# Patient Record
Sex: MALE | Race: WHITE | NOT HISPANIC OR LATINO | Employment: FULL TIME | URBAN - NONMETROPOLITAN AREA
[De-identification: names, ages, dates, MRNs, and addresses within clinical notes are randomized per-mention and may not be internally consistent; named-entity substitution may affect disease eponyms.]

---

## 2017-07-23 ENCOUNTER — HISTORY (OUTPATIENT)
Dept: EMERGENCY MEDICINE | Facility: OTHER | Age: 32
End: 2017-07-23

## 2017-10-17 ENCOUNTER — AMBULATORY - GICH (OUTPATIENT)
Dept: SCHEDULING | Facility: OTHER | Age: 32
End: 2017-10-17

## 2019-05-02 ENCOUNTER — OFFICE VISIT (OUTPATIENT)
Dept: FAMILY MEDICINE | Facility: OTHER | Age: 34
End: 2019-05-02
Attending: NURSE PRACTITIONER
Payer: COMMERCIAL

## 2019-05-02 VITALS
TEMPERATURE: 97.2 F | BODY MASS INDEX: 29.05 KG/M2 | RESPIRATION RATE: 18 BRPM | DIASTOLIC BLOOD PRESSURE: 70 MMHG | WEIGHT: 196.13 LBS | SYSTOLIC BLOOD PRESSURE: 124 MMHG | HEART RATE: 68 BPM | HEIGHT: 69 IN

## 2019-05-02 DIAGNOSIS — L40.9 PSORIASIS: Primary | ICD-10-CM

## 2019-05-02 DIAGNOSIS — B35.4 TINEA CORPORIS: ICD-10-CM

## 2019-05-02 PROCEDURE — G0463 HOSPITAL OUTPT CLINIC VISIT: HCPCS

## 2019-05-02 PROCEDURE — 99213 OFFICE O/P EST LOW 20 MIN: CPT | Performed by: NURSE PRACTITIONER

## 2019-05-02 RX ORDER — TRIAMCINOLONE ACETONIDE 1 MG/G
OINTMENT TOPICAL 2 TIMES DAILY
Qty: 80 G | Refills: 0 | Status: SHIPPED | OUTPATIENT
Start: 2019-05-02

## 2019-05-02 RX ORDER — KETOCONAZOLE 20 MG/G
CREAM TOPICAL DAILY
Qty: 30 G | Refills: 0 | Status: SHIPPED | OUTPATIENT
Start: 2019-05-02

## 2019-05-02 ASSESSMENT — MIFFLIN-ST. JEOR: SCORE: 1815

## 2019-05-02 ASSESSMENT — ENCOUNTER SYMPTOMS
FEVER: 0
ACTIVITY CHANGE: 0
CHILLS: 0
FATIGUE: 0

## 2019-05-02 ASSESSMENT — PAIN SCALES - GENERAL: PAINLEVEL: NO PAIN (0)

## 2019-05-02 NOTE — PROGRESS NOTES
"  SUBJECTIVE:   Davon Nash is a 33 year old male who presents to clinic today for the following health issues:    HPI  Patient presents for evaluation of psoriasis. Has patches on bilateral elbows and bilateral knees. Report having this for ten years, but feels like he wants to get it treated now. He reports no joint involvement. Nails normal in appearance. He has not treated this with any medication and just dealt with the lesions.   He also notes a quarter size lesion on his left chest. It is pruritic.     There are no active problems to display for this patient.    Past Medical History:   Diagnosis Date     Psoriasis 2010      History reviewed. No pertinent surgical history.    Review of Systems   Constitutional: Negative for activity change, chills, fatigue and fever.   Skin: Positive for rash.        OBJECTIVE:     /70 (BP Location: Right arm, Patient Position: Sitting, Cuff Size: Adult Large)   Pulse 68   Temp 97.2  F (36.2  C) (Tympanic)   Resp 18   Ht 1.753 m (5' 9\")   Wt 89 kg (196 lb 2 oz)   BMI 28.96 kg/m    Body mass index is 28.96 kg/m .  Physical Exam   Constitutional: He appears well-developed and well-nourished. No distress.   Skin: Skin is warm and dry. Rash noted.   Rash: Lesion on bilateral elbows and bilateral knees. Red scaling defined papule with varying amounts of silvery scales.   He has a quarter size lesion on left chest with scaling edges and central clearing.   Diagnostic Test Results:  none     ASSESSMENT/PLAN:   1. Psoriasis  Hand-out provided on psoriasis. We will start with topical steroid plus vitamin D analogue for initial treatment. I recommended daily emollient, especially after bathing/showering. I suggested referral to dermatology to discuss further management of lesions, he may benefit from UV therapy or other medicatons to better manage his psoriasis.   - DERMATOLOGY REFERRAL    2. Tinea corporis:  Small lesion noted on his left chest possible tinea infection. " Discussed use of antifungal cream, prescribed ointment. Use twice daily until lesions resolves. Discussed that if no improvement could be a psoriatic lesion and would also benefit from steroid cream.     I spent approximately 15 minutes with the patient (exclusive of separately billed services/procedures), with greater than 50% spent in counseling, prognosis, risks and benefits of management or follow-up.  Reviewed importance of compliance with chosen treatment options and follow-up.  Risk factor reduction and patient education and coordinating care, establishing and/or reviewing the patient's medical record also completed during today's exam .    Yolanda Rodriguez [facility] AND [facility]

## 2019-05-02 NOTE — NURSING NOTE
Patient presents to clinic for psoriasis outbreak on right elbow and upper left chest.    Medication Reconciliation: complete    Celia Olivier LPN

## 2019-05-03 RX ORDER — CALCIPOTRIENE 50 UG/G
OINTMENT TOPICAL
Qty: 120 G | Refills: 3 | Status: SHIPPED | OUTPATIENT
Start: 2019-05-03

## 2019-05-14 ENCOUNTER — TRANSFERRED RECORDS (OUTPATIENT)
Dept: HEALTH INFORMATION MANAGEMENT | Facility: OTHER | Age: 34
End: 2019-05-14

## 2020-05-14 DIAGNOSIS — L40.0 PSORIASIS VULGARIS: Primary | ICD-10-CM

## 2020-05-14 DIAGNOSIS — Z79.899 ENCOUNTER FOR LONG-TERM (CURRENT) USE OF OTHER MEDICATIONS: ICD-10-CM

## 2020-05-18 DIAGNOSIS — Z79.899 ENCOUNTER FOR LONG-TERM (CURRENT) USE OF OTHER MEDICATIONS: ICD-10-CM

## 2020-05-18 DIAGNOSIS — L40.0 PSORIASIS VULGARIS: ICD-10-CM

## 2020-05-18 PROCEDURE — 86705 HEP B CORE ANTIBODY IGM: CPT | Mod: ZL | Performed by: DERMATOLOGY

## 2020-05-18 PROCEDURE — 87389 HIV-1 AG W/HIV-1&-2 AB AG IA: CPT | Mod: ZL | Performed by: DERMATOLOGY

## 2020-05-18 PROCEDURE — 36415 COLL VENOUS BLD VENIPUNCTURE: CPT | Mod: ZL | Performed by: DERMATOLOGY

## 2020-05-18 PROCEDURE — 86481 TB AG RESPONSE T-CELL SUSP: CPT | Mod: ZL | Performed by: DERMATOLOGY

## 2020-05-18 PROCEDURE — 86803 HEPATITIS C AB TEST: CPT | Mod: ZL | Performed by: DERMATOLOGY

## 2020-05-18 PROCEDURE — 87340 HEPATITIS B SURFACE AG IA: CPT | Mod: ZL | Performed by: DERMATOLOGY

## 2020-05-20 LAB
GAMMA INTERFERON BACKGROUND BLD IA-ACNC: 0.02 IU/ML
HBV CORE IGM SERPL QL IA: NONREACTIVE
HBV SURFACE AG SERPL QL IA: NONREACTIVE
HCV AB SERPL QL IA: NONREACTIVE
HIV 1+2 AB+HIV1 P24 AG SERPL QL IA: NONREACTIVE
M TB IFN-G BLD-IMP: NEGATIVE
M TB IFN-G CD4+ BCKGRND COR BLD-ACNC: 8.49 IU/ML
MITOGEN IGNF BCKGRD COR BLD-ACNC: 0.03 IU/ML
MITOGEN IGNF BCKGRD COR BLD-ACNC: 0.04 IU/ML

## 2020-07-27 ENCOUNTER — OFFICE VISIT (OUTPATIENT)
Dept: FAMILY MEDICINE | Facility: OTHER | Age: 35
End: 2020-07-27
Attending: FAMILY MEDICINE
Payer: COMMERCIAL

## 2020-07-27 ENCOUNTER — HOSPITAL ENCOUNTER (OUTPATIENT)
Dept: GENERAL RADIOLOGY | Facility: OTHER | Age: 35
End: 2020-07-27
Attending: FAMILY MEDICINE
Payer: COMMERCIAL

## 2020-07-27 VITALS
BODY MASS INDEX: 26.32 KG/M2 | OXYGEN SATURATION: 98 % | WEIGHT: 178.2 LBS | HEART RATE: 63 BPM | RESPIRATION RATE: 12 BRPM | SYSTOLIC BLOOD PRESSURE: 134 MMHG | DIASTOLIC BLOOD PRESSURE: 68 MMHG | TEMPERATURE: 98.4 F

## 2020-07-27 DIAGNOSIS — M25.552 HIP PAIN, LEFT: ICD-10-CM

## 2020-07-27 DIAGNOSIS — M25.511 CHRONIC RIGHT SHOULDER PAIN: ICD-10-CM

## 2020-07-27 DIAGNOSIS — M25.552 HIP PAIN, LEFT: Primary | ICD-10-CM

## 2020-07-27 DIAGNOSIS — L40.9 PSORIASIS: ICD-10-CM

## 2020-07-27 DIAGNOSIS — G89.29 CHRONIC RIGHT SHOULDER PAIN: ICD-10-CM

## 2020-07-27 DIAGNOSIS — D17.30 LIPOMA OF SKIN AND SUBCUTANEOUS TISSUE: ICD-10-CM

## 2020-07-27 PROCEDURE — 99214 OFFICE O/P EST MOD 30 MIN: CPT | Performed by: FAMILY MEDICINE

## 2020-07-27 PROCEDURE — G0463 HOSPITAL OUTPT CLINIC VISIT: HCPCS

## 2020-07-27 PROCEDURE — 73502 X-RAY EXAM HIP UNI 2-3 VIEWS: CPT

## 2020-07-27 RX ORDER — ADALIMUMAB 40MG/0.4ML
KIT SUBCUTANEOUS
COMMUNITY
Start: 2020-07-22

## 2020-07-27 ASSESSMENT — PAIN SCALES - GENERAL: PAINLEVEL: MILD PAIN (2)

## 2020-07-27 ASSESSMENT — PATIENT HEALTH QUESTIONNAIRE - PHQ9: SUM OF ALL RESPONSES TO PHQ QUESTIONS 1-9: 0

## 2020-07-27 NOTE — NURSING NOTE
Patient here for right shoulder pain, left hip pain and a lump on his back.  After receiving his second Humaria injection he started with acid reflux then started omeprazole.Medication Reconciliation: complete.    Matilde Brown LPN  7/27/2020 3:45 PM

## 2020-07-28 PROBLEM — G89.29 CHRONIC RIGHT SHOULDER PAIN: Status: ACTIVE | Noted: 2020-07-28

## 2020-07-28 PROBLEM — M25.511 CHRONIC RIGHT SHOULDER PAIN: Status: ACTIVE | Noted: 2020-07-28

## 2020-07-28 PROBLEM — L40.9 PSORIASIS: Status: ACTIVE | Noted: 2020-07-28

## 2020-07-28 PROBLEM — D17.30 LIPOMA OF SKIN AND SUBCUTANEOUS TISSUE: Status: ACTIVE | Noted: 2020-07-28

## 2020-07-28 NOTE — PROGRESS NOTES
"  SUBJECTIVE:   Davon Nash is a 35 year old male who presents to clinic today for the following health issues: Right shoulder pain left hip pain lump on his back    Patient arrives here because of right shoulder pain left hip pain lump on his back.  He lump on his back seems to be increasing in size.  He had recently started Humira and has had 2 doses.  Patient states that he has a history of psoriasis.  He brought up his shoulder and hip pain to his dermatologist he states his dermatologist felt that it was likely overuse.  His shoulder and hip have gotten a little bit better over the last week.  The pain now is on and off through the years.  He denies any injury to the right shoulder or hip.  He is not complaining of any groin pain to the hip but states is more lateral and feels \"deep\".        There are no active problems to display for this patient.    Past Medical History:   Diagnosis Date     Psoriasis 2010      History reviewed. No pertinent surgical history.    Review of Systems     OBJECTIVE:     /68   Pulse 63   Temp 98.4  F (36.9  C)   Resp 12   Wt 80.8 kg (178 lb 3.2 oz)   SpO2 98%   BMI 26.32 kg/m    Body mass index is 26.32 kg/m .  Physical Exam  Constitutional:       Appearance: Normal appearance.   Cardiovascular:      Rate and Rhythm: Regular rhythm.   Pulmonary:      Effort: Pulmonary effort is normal.      Breath sounds: Normal breath sounds.   Musculoskeletal: Normal range of motion.         General: No swelling, tenderness, deformity or signs of injury.      Comments: Both right shoulder and left hip have good range of motion with no pain.   Skin:     Comments: Typical lipoma over his left shoulder.   Neurological:      Mental Status: He is alert.         Diagnostic Test Results:  none     ASSESSMENT/PLAN:         1. Hip pain, left  X-rays show osteophyte present.  Radiology report indicates possible pincer deformity.  Letter will be dictated.  - XR Hip Left 2-3 Views; " Future    2. Chronic right shoulder pain  Possible rotator cuff in origin.  He certainly has good range of motion.  I also discussed this could be possible due to psoriatic arthritis.    3. Lipoma of skin and subcutaneous tissue  Reassurance is given    4. Psoriasis          Vladimir Oglesby MD  [facility] AND [facility]

## 2020-08-25 ENCOUNTER — OFFICE VISIT (OUTPATIENT)
Dept: ORTHOPEDICS | Facility: OTHER | Age: 35
End: 2020-08-25
Attending: FAMILY MEDICINE
Payer: COMMERCIAL

## 2020-08-25 VITALS
HEART RATE: 56 BPM | BODY MASS INDEX: 26.73 KG/M2 | SYSTOLIC BLOOD PRESSURE: 130 MMHG | DIASTOLIC BLOOD PRESSURE: 80 MMHG | WEIGHT: 181 LBS

## 2020-08-25 DIAGNOSIS — M25.511 RIGHT SHOULDER PAIN, UNSPECIFIED CHRONICITY: Primary | ICD-10-CM

## 2020-08-25 DIAGNOSIS — M25.552 HIP PAIN, LEFT: ICD-10-CM

## 2020-08-25 PROCEDURE — 99203 OFFICE O/P NEW LOW 30 MIN: CPT | Performed by: ORTHOPAEDIC SURGERY

## 2020-08-25 PROCEDURE — G0463 HOSPITAL OUTPT CLINIC VISIT: HCPCS

## 2020-08-25 NOTE — PROGRESS NOTES
Visit Date:   08/25/2020      REASON FOR EVALUATION:     1.  Left hip pain.   2.  Right shoulder pain.      HISTORY:  Davon comes in with regards to left hip as well as shoulder pain.  It has been going on for years.  It seems to be getting worse here.  Symptoms are kind of random.  He gets a catching and locking in his hip at times.  Pain is sharp within the groin as well.  Had x-rays done previously that did show evidence for some hip impingement with overgrowth on the femoral head as well as off the acetabulum.  Symptoms seem to be somewhat progressive here over time.  He is not undergone any specific treatment as far as therapy or injections.  Does use just sparingly occasional anti-inflammatories, but nothing of any consistency at this point.      He is also reporting right shoulder pain, especially with overactivity, reaching out and reaching across.  That has been ongoing again for a couple of years in nature.  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  He does have nighttime discomfort.  It hurts when he lies on that side to some extent here as well, in addition to that.  The patient reports a progression of his overall symptomatology there at this time.      MEDICATIONS:  Have been reviewed.      ALLERGIES:  NOTED TO HYDROCORTISONE.      REVIEW OF SYSTEMS:  Otherwise negative with the exception as stated above.      PHYSICAL EXAMINATION:  The patient is 178 pounds.  Blood pressure is 130/80, pulse 56.  Alert and oriented x3, cooperative and pleasant with exam, in no acute distress.  Does ambulate with satisfactory gait.  Affect is appropriate here as well.  Examination of the left hip shows hip flex to 100, internal rotation of 10, external rotation of 20 and abduction of 30.  Pain with hip flexion and internal rotation is noted at this point in time.  No pain with palpation across the trochanteric bursa.  Mild swelling is  seen about the hip itself.  Dorsalis pedis pulse 2+.  Straight leg raise also asymptomatic and no pain with FREDRICK test.  Right shoulder exam shows full range of motion, pain with Cassia testing.  Pain with impingement test.  Mild weakness with abduction as well as external strength testing against resistance at this point in time.  Spurling test is negative.  No instability is identified.  Radial pulse 2+ and again no skin lesions otherwise seen.      X-rays were reviewed.  Does show evidence for left hip impingement with minimal arthritic changes seen at this point in time.      IMPRESSION:     1.  Left hip pain, possible labral pathology with underlying impingement.   2.  Right shoulder pain, possible cuff pathology plus anterior labral pathology.      PLAN:  MRI arthrogram left hip has been advised and recommended given constellation of symptoms as well as what reproduces this as well as underlying bone anatomy as far as the shoulder is concerned.  I would recommend he does proceed forward with MRI of the shoulder to evaluate for any rotator cuff tearing as well as a superior labral tear and he will be contacted once that study is complete on both sites here.         PRITESH BERNAL MD             D: 2020   T: 2020   MT: JENARO      Name:     TAZ JOHN   MRN:      [id number]        Account:      [id number]   :      1985           Visit Date:   2020      Document: [id number]

## 2020-08-25 NOTE — PROGRESS NOTES
Patient is here for consult on his left hip pain.  Janey Schumacher LPN .....................8/25/2020 10:41 AM

## 2020-09-03 ENCOUNTER — HOSPITAL ENCOUNTER (OUTPATIENT)
Dept: MRI IMAGING | Facility: OTHER | Age: 35
End: 2020-09-03
Attending: ORTHOPAEDIC SURGERY
Payer: COMMERCIAL

## 2020-09-03 ENCOUNTER — HOSPITAL ENCOUNTER (OUTPATIENT)
Dept: GENERAL RADIOLOGY | Facility: OTHER | Age: 35
End: 2020-09-03
Attending: ORTHOPAEDIC SURGERY
Payer: COMMERCIAL

## 2020-09-03 DIAGNOSIS — M25.552 HIP PAIN, LEFT: ICD-10-CM

## 2020-09-03 DIAGNOSIS — M25.511 RIGHT SHOULDER PAIN, UNSPECIFIED CHRONICITY: ICD-10-CM

## 2020-09-03 PROCEDURE — 25000125 ZZHC RX 250: Performed by: RADIOLOGY

## 2020-09-03 PROCEDURE — A9575 INJ GADOTERATE MEGLUMI 0.1ML: HCPCS | Performed by: RADIOLOGY

## 2020-09-03 PROCEDURE — 77002 NEEDLE LOCALIZATION BY XRAY: CPT

## 2020-09-03 PROCEDURE — 25500064 ZZH RX 255 OP 636: Performed by: RADIOLOGY

## 2020-09-03 PROCEDURE — 73722 MRI JOINT OF LWR EXTR W/DYE: CPT | Mod: LT

## 2020-09-03 PROCEDURE — 73221 MRI JOINT UPR EXTREM W/O DYE: CPT | Mod: RT

## 2020-09-03 RX ORDER — GADOTERATE MEGLUMINE 376.9 MG/ML
0.1 INJECTION INTRAVENOUS ONCE
Status: COMPLETED | OUTPATIENT
Start: 2020-09-03 | End: 2020-09-03

## 2020-09-03 RX ORDER — LIDOCAINE HYDROCHLORIDE 10 MG/ML
2 INJECTION, SOLUTION EPIDURAL; INFILTRATION; INTRACAUDAL; PERINEURAL ONCE
Status: COMPLETED | OUTPATIENT
Start: 2020-09-03 | End: 2020-09-03

## 2020-09-03 RX ADMIN — IOHEXOL 5 ML: 240 INJECTION, SOLUTION INTRATHECAL; INTRAVASCULAR; INTRAVENOUS; ORAL at 15:12

## 2020-09-03 RX ADMIN — LIDOCAINE HYDROCHLORIDE 2 ML: 10 INJECTION, SOLUTION INFILTRATION; PERINEURAL at 15:13

## 2020-09-03 RX ADMIN — GADOTERATE MEGLUMINE 0.1 ML: 376.9 INJECTION INTRAVENOUS at 15:12

## 2020-09-15 ENCOUNTER — TRANSFERRED RECORDS (OUTPATIENT)
Dept: HEALTH INFORMATION MANAGEMENT | Facility: OTHER | Age: 35
End: 2020-09-15

## 2020-09-22 ENCOUNTER — OFFICE VISIT (OUTPATIENT)
Dept: ORTHOPEDICS | Facility: OTHER | Age: 35
End: 2020-09-22
Attending: ORTHOPAEDIC SURGERY
Payer: COMMERCIAL

## 2020-09-22 DIAGNOSIS — G89.29 CHRONIC RIGHT SHOULDER PAIN: Primary | ICD-10-CM

## 2020-09-22 DIAGNOSIS — M25.511 CHRONIC RIGHT SHOULDER PAIN: Primary | ICD-10-CM

## 2020-09-22 PROCEDURE — G0463 HOSPITAL OUTPT CLINIC VISIT: HCPCS

## 2020-09-22 PROCEDURE — 25000125 ZZHC RX 250: Performed by: ORTHOPAEDIC SURGERY

## 2020-09-22 PROCEDURE — 99213 OFFICE O/P EST LOW 20 MIN: CPT | Mod: 25 | Performed by: ORTHOPAEDIC SURGERY

## 2020-09-22 PROCEDURE — G0463 HOSPITAL OUTPT CLINIC VISIT: HCPCS | Mod: 25

## 2020-09-22 PROCEDURE — 20610 DRAIN/INJ JOINT/BURSA W/O US: CPT | Mod: RT | Performed by: ORTHOPAEDIC SURGERY

## 2020-09-22 PROCEDURE — 25000128 H RX IP 250 OP 636: Performed by: ORTHOPAEDIC SURGERY

## 2020-09-22 RX ORDER — LIDOCAINE HYDROCHLORIDE 10 MG/ML
4 INJECTION, SOLUTION EPIDURAL; INFILTRATION; INTRACAUDAL; PERINEURAL ONCE
Status: COMPLETED | OUTPATIENT
Start: 2020-09-22 | End: 2020-09-22

## 2020-09-22 RX ORDER — TRIAMCINOLONE ACETONIDE 40 MG/ML
40 INJECTION, SUSPENSION INTRA-ARTICULAR; INTRAMUSCULAR ONCE
Status: COMPLETED | OUTPATIENT
Start: 2020-09-22 | End: 2020-09-22

## 2020-09-22 RX ADMIN — LIDOCAINE HYDROCHLORIDE 4 ML: 10 INJECTION, SOLUTION EPIDURAL; INFILTRATION; INTRACAUDAL; PERINEURAL at 14:39

## 2020-09-22 RX ADMIN — TRIAMCINOLONE ACETONIDE 40 MG: 40 INJECTION, SUSPENSION INTRA-ARTICULAR; INTRAMUSCULAR at 14:39

## 2020-09-22 NOTE — PROGRESS NOTES
Patient is here for follow up on his shoulder and hip.  Janey Schumacher LPN .....................9/22/2020 2:24 PM

## 2020-09-23 NOTE — PROGRESS NOTES
Visit Date:   2020      REASON FOR EVALUATION:  Right shoulder pain.      HISTORY:  Davon comes in followup for right shoulder MRI results.  MRI does show cuff tendinitis, some labral tearing, AC joint arthrosis, type 2 acromion and the rotator cuff tendinitis and partial sided fraying present at this point in time.  The patient is here to look at an injection set up for therapies and then moving forward with the next steps.  He does have a labral cyst present as well in addition to anterior superior labral tear.  Does report pain with activity, a little bit better with rest.  Symptoms seem to be somewhat progressive here in time.      PHYSICAL EXAMINATION:  Shoulder shows full range of motion, pain with elevation to 90, pain with impingement test, pain with cross arm adduction.  Pain with speed testing as well as [place] testing here in addition to that.  Rotator cuff strength is 5/5.  Neurovascular examination otherwise intact.      PROCEDURE:  Following informed consent and sterile preparation, the patient's right shoulder subacromial space was injected with 4 mL of 1% lidocaine and 40 mg of Kenalog under sterile conditions.  The patient did tolerate the procedure well.      IMPRESSION:  Right shoulder cuff tendinitis, AC arthrosis with underlying labral tear.      PLAN:  Injection at this time, set him up in for formalized therapy.  Reassess 4 weeks to determine if he is improving or not.  If no success or progress was identified would recommend consideration for shoulder arthroscopy, decompression, distal clavicle and labral debridement.  The patient is in agreement with that plan and will see him back in 4 weeks as scheduled.         PRITESH BERNAL MD             D: 2020   T: 2020   MT:       Name:     DAVON JOHN   MRN:      [id number]        Account:      [id number]   :      1985           Visit Date:   2020      Document: [id number]

## 2020-09-24 ENCOUNTER — HOSPITAL ENCOUNTER (OUTPATIENT)
Dept: PHYSICAL THERAPY | Facility: OTHER | Age: 35
Setting detail: THERAPIES SERIES
End: 2020-09-24
Attending: ORTHOPAEDIC SURGERY
Payer: COMMERCIAL

## 2020-09-24 DIAGNOSIS — M25.511 CHRONIC RIGHT SHOULDER PAIN: ICD-10-CM

## 2020-09-24 DIAGNOSIS — G89.29 CHRONIC RIGHT SHOULDER PAIN: ICD-10-CM

## 2020-09-24 PROCEDURE — 97110 THERAPEUTIC EXERCISES: CPT | Mod: GP

## 2020-09-24 PROCEDURE — 97161 PT EVAL LOW COMPLEX 20 MIN: CPT | Mod: GP

## 2020-09-26 NOTE — PROGRESS NOTES
[facility]          OUTPATIENT PHYSICAL THERAPY ORTHOPEDIC EVALUATION  PLAN OF TREATMENT FOR OUTPATIENT REHABILITATION  (COMPLETE FOR INITIAL CLAIMS ONLY)  Patient's Last Name, First Name, M.I.  YOB: 1985  Van Nashon  ANNE-MARIE    Provider s Name:  [facility]   Medical Record No.  [id number]   Start of Care Date:  09/24/20   Onset Date:  (2 years ago)   Type:     _X__PT   ___OT   ___SLP Medical Diagnosis:  Chronic R shoulder pain     PT Diagnosis:  impaired shoulder funciton   Visits from SOC:  1      _________________________________________________________________________________  Plan of Treatment/Functional Goals:  joint mobilization, manual therapy, neuromuscular re-education, ROM, strengthening, stretching     Hot packs, Iontophoresis (Dexamethasone 4mg per mL), Ultrasound, Electrical stimulation, Cryotherapy     Goals  Goal Identifier: Posture  Goal Description: Pt will demonstrate improved posture with more retracted shoulders to allow for more GH clearance during overhead movements.  Target Date: 10/24/20    Goal Identifier: Work tolerance  Goal Description: Pt will be able to complete full work shift and all duties without increasing pain in 4 weeks  Target Date: 10/24/20    Goal Identifier: Strength  Goal Description: Pt will be able to lift 75 lbs from floor to shoulder height shelf in order to perform job duties.  Target Date: 10/24/20      Therapy Frequency:  2 times/Week  Predicted Duration of Therapy Intervention:  4 weeks    Jim Caldwell PT                 I CERTIFY THE NEED FOR THESE SERVICES FURNISHED UNDER        THIS PLAN OF TREATMENT AND WHILE UNDER MY CARE .             Physician Signature               Date    X_____________________________________________________                             Certification Date From:  09/24/20   Certification Date To:  11/07/20    Referring Provider:  Dr. Pendleton    Initial Assessment        See Epic  Evaluation Start of Care Date: 09/24/20

## 2020-09-26 NOTE — PROGRESS NOTES
09/24/20 1100   General Information   Type of Visit Initial OP Ortho PT Evaluation   Start of Care Date 09/24/20   Referring Physician Dr. Pendleton   Patient/Family Goals Statement Improve lifting quality and decrease shoulder pain   Orders Evaluate and Treat   Date of Order 09/22/20   Certification Required? Yes   Medical Diagnosis Chronic R shoulder pain   Surgical/Medical history reviewed Yes   General Information Comments Pt is a 35-year old male coming to therapy for a long history of R shoulder pain. 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 He is required to do a consistent amount of lifting. He went to see Dr. Pendleton who did an MRI of his shoulder. Dr. Pendleton recommended he try an injection, some conservative therapy, and if that fails then he believes surgery would help remove the bone spurs and fix his issue. Pt did get an injection and his shoulder has felt great for the last two days. He is unsure of how long the injection will last.   Body Part(s)   Body Part(s) Shoulder   Presentation and Etiology   Impairments A. Pain;D. Decreased ROM;E. Decreased flexibility;K. Numbness   Functional Limitations perform required work activities;perform activities of daily living;perform desired leisure / sports activities   Symptom Location R shoulder   How/Where did it occur From insidious onset   Onset date of current episode/exacerbation   (2 years ago)   Chronicity Chronic   Pain rating (0-10 point scale) Best (/10);Worst (/10)   Best (/10) 0   Worst (/10) 7   Pain quality A. Sharp;B. Dull;C. Aching;D. Burning;E. Shooting;F. Stabbing   Frequency of pain/symptoms A. Constant   Pain/symptoms are: Worse during the day   Pain/symptoms exacerbated by A. Sitting;C. Lifting;H. Overhead reach   Prior Level of Function   Prior Level of Function-ADLs impaired overhead reaching   Current Level of Function   Patient role/employment history A. Employed   Employment Comments Co-Owner of [employer]   Living  environment [place]   Fall Risk Screen   Fall screen completed by PT   Is patient a fall risk? No   Shoulder Objective Findings   Side (if bilateral, select both right and left) Right   Integumentary  unremarkable   Posture rounded shoulders   Cervical Screen (ROM, quadrant) negative   Thoracic Mobility Screen negative   Shoulder ROM Comment Pt has near full ROM at this point in time.    Neer's Test negative   Beach-Walter Test negative   Crossover Test negative   Palpation mild tenderness noted with supraspinatus palpation   Right Shoulder Flexion AROM 164   Right Shoulder Abduction AROM 179   Right Shoulder ER AROM WFLS   Right Shoulder IR AROM T10   Right Shoulder Flexion Strength 4+   Right Shoulder Abduction Strength 4+   Right Shoulder ER Strength 4+   Right Shoulder IR Strength 5   Right Shoulder Extension Strength 5   Planned Therapy Interventions   Planned Therapy Interventions joint mobilization;manual therapy;neuromuscular re-education;ROM;strengthening;stretching   Planned Modality Interventions   Planned Modality Interventions Hot packs;Iontophoresis;Ultrasound;Electrical stimulation;Cryotherapy   Clinical Impression   Criteria for Skilled Therapeutic Interventions Met yes, treatment indicated   PT Diagnosis impaired shoulder funciton   Influenced by the following impairments posture, weakness,    Functional limitations due to impairments limited overhead lifting, decreased work tolerance   Clinical Presentation Stable/Uncomplicated   Clinical Decision Making (Complexity) Low complexity   Therapy Frequency 2 times/Week   Predicted Duration of Therapy Intervention (days/wks) 4 weeks   Risk & Benefits of therapy have been explained Yes   Patient, Family & other staff in agreement with plan of care Yes   Clinical Impression Comments Pt demonstrates with significant improvement from his injection. Does appear to have some muscle weakness in scapular muscles and postural issues that could benefit  from skilled physical therapy services   ORTHO GOALS   PT Ortho Eval Goals 1;2;3   Ortho Goal 1   Goal Identifier Posture   Goal Description Pt will demonstrate improved posture with more retracted shoulders to allow for more GH clearance during overhead movements.   Target Date 10/24/20   Ortho Goal 2   Goal Identifier Work tolerance   Goal Description Pt will be able to complete full work shift and all duties without increasing pain in 4 weeks   Target Date 10/24/20   Ortho Goal 3   Goal Identifier Strength   Goal Description Pt will be able to lift 75 lbs from floor to shoulder height shelf in order to perform job duties.   Target Date 10/24/20   Total Evaluation Time   PT Malachi Low Complexity Minutes (98927) 30   Therapy Certification   Certification date from 09/24/20   Certification date to 11/07/20   Medical Diagnosis Chronic R shoulder pain

## 2020-09-30 ENCOUNTER — HOSPITAL ENCOUNTER (OUTPATIENT)
Dept: PHYSICAL THERAPY | Facility: OTHER | Age: 35
Setting detail: THERAPIES SERIES
End: 2020-09-30
Attending: ORTHOPAEDIC SURGERY
Payer: COMMERCIAL

## 2020-09-30 PROCEDURE — 97110 THERAPEUTIC EXERCISES: CPT | Mod: GP

## 2020-10-12 ENCOUNTER — HOSPITAL ENCOUNTER (OUTPATIENT)
Dept: PHYSICAL THERAPY | Facility: OTHER | Age: 35
Setting detail: THERAPIES SERIES
End: 2020-10-12
Attending: ORTHOPAEDIC SURGERY
Payer: COMMERCIAL

## 2020-10-12 PROCEDURE — 97110 THERAPEUTIC EXERCISES: CPT | Mod: GP

## 2020-10-14 ENCOUNTER — OFFICE VISIT (OUTPATIENT)
Dept: ORTHOPEDICS | Facility: OTHER | Age: 35
End: 2020-10-14
Attending: ORTHOPAEDIC SURGERY
Payer: COMMERCIAL

## 2020-10-14 DIAGNOSIS — G89.29 CHRONIC RIGHT SHOULDER PAIN: ICD-10-CM

## 2020-10-14 DIAGNOSIS — M25.559 HIP PAIN: Primary | ICD-10-CM

## 2020-10-14 DIAGNOSIS — M25.511 CHRONIC RIGHT SHOULDER PAIN: ICD-10-CM

## 2020-10-14 PROCEDURE — 99212 OFFICE O/P EST SF 10 MIN: CPT | Performed by: ORTHOPAEDIC SURGERY

## 2020-10-14 PROCEDURE — G0463 HOSPITAL OUTPT CLINIC VISIT: HCPCS

## 2020-10-14 NOTE — PROGRESS NOTES
Patient is here for follow up on his right shoulder pain.   Janey Schumacher LPN .....................10/14/2020 1:48 PM

## 2020-10-15 NOTE — PROGRESS NOTES
Visit Date:   10/14/2020      REASON FOR EVALUATION:     1.  Right shoulder recheck   2.  Left hip pain.      HISTORY:  Taz comes in.  Shoulder injection has helped out, but he is still having some symptomology at this point in time and wanted to discuss his options here.  Interestingly enough, he did get some left hip relief after injection into the shoulder, but there was question about whether he would benefit from intra-articular injection there.  He does have an upcoming appointment as well with Vance Mcfarland in addition to that.      PHYSICAL EXAMINATION:  Shoulder shows full range of motion, pain with impingement test, pain with cross arm adduction.  Warsaw's test was slightly positive as well.  Left hip exam shows pain with hip flexion as well as internal rotation at this point in time as well.  Mild pain with impingement testing is seen here in addition to that.  Overall hip flexion strength is 5/5 in addition.      IMPRESSION:     1.  Right shoulder pain with underlying acromioclavicular joint impingement.   2.  Left hip labral fraying in addition to hip impingement.      PLAN:  At this time, we will get him set for a left hip injection with fluoroscopic guidance.  He is going to get his appointment with Dr. Mcfarland as far as the shoulder is concerned.  If symptoms continue to progress, certainly consideration for shoulder arthroscopy, decompression, distal clavicle resection and labral repair would be warranted in that scenario moving forward.  The patient will keep me posted in regards to needs for that process.         PRITESH BERNAL MD             D: 10/14/2020   T: 10/14/2020   MT: ELIZABETH      Name:     TAZ JOHN   MRN:      [id number]        Account:      [id number]   :      1985           Visit Date:   10/14/2020      Document: [id number]

## 2020-10-20 ENCOUNTER — HOSPITAL ENCOUNTER (OUTPATIENT)
Dept: GENERAL RADIOLOGY | Facility: OTHER | Age: 35
Discharge: HOME | End: 2020-10-20
Attending: ORTHOPAEDIC SURGERY | Admitting: ORTHOPAEDIC SURGERY
Payer: COMMERCIAL

## 2020-10-20 DIAGNOSIS — M25.559 HIP PAIN: ICD-10-CM

## 2020-10-20 PROCEDURE — 255N000002 HC RX 255 OP 636: Performed by: RADIOLOGY

## 2020-10-20 PROCEDURE — 77002 NEEDLE LOCALIZATION BY XRAY: CPT

## 2020-10-20 PROCEDURE — 250N000011 HC RX IP 250 OP 636: Performed by: RADIOLOGY

## 2020-10-20 PROCEDURE — 250N000009 HC RX 250: Performed by: RADIOLOGY

## 2020-10-20 RX ORDER — TRIAMCINOLONE ACETONIDE 40 MG/ML
40 INJECTION, SUSPENSION INTRA-ARTICULAR; INTRAMUSCULAR ONCE
Status: COMPLETED | OUTPATIENT
Start: 2020-10-20 | End: 2020-10-20

## 2020-10-20 RX ORDER — LIDOCAINE HYDROCHLORIDE 10 MG/ML
2 INJECTION, SOLUTION INFILTRATION; PERINEURAL ONCE
Status: COMPLETED | OUTPATIENT
Start: 2020-10-20 | End: 2020-10-20

## 2020-10-20 RX ORDER — BUPIVACAINE HYDROCHLORIDE 5 MG/ML
3 INJECTION, SOLUTION EPIDURAL; INTRACAUDAL ONCE
Status: COMPLETED | OUTPATIENT
Start: 2020-10-20 | End: 2020-10-20

## 2020-10-20 RX ADMIN — TRIAMCINOLONE ACETONIDE 40 MG: 40 INJECTION, SUSPENSION INTRA-ARTICULAR; INTRAMUSCULAR at 13:06

## 2020-10-20 RX ADMIN — LIDOCAINE HYDROCHLORIDE 2 ML: 10 INJECTION, SOLUTION INFILTRATION; PERINEURAL at 13:06

## 2020-10-20 RX ADMIN — IOHEXOL 2 ML: 240 INJECTION, SOLUTION INTRATHECAL; INTRAVASCULAR; INTRAVENOUS; ORAL at 13:06

## 2020-10-20 RX ADMIN — BUPIVACAINE HYDROCHLORIDE 3 ML: 5 INJECTION, SOLUTION EPIDURAL; INTRACAUDAL at 13:06

## 2020-11-16 ENCOUNTER — ALLIED HEALTH/NURSE VISIT (OUTPATIENT)
Dept: FAMILY MEDICINE | Facility: OTHER | Age: 35
End: 2020-11-16
Payer: COMMERCIAL

## 2020-11-16 DIAGNOSIS — R43.2 AGEUSIA: ICD-10-CM

## 2020-11-16 DIAGNOSIS — R43.0 ANOSMIA: Primary | ICD-10-CM

## 2020-11-16 PROCEDURE — 99207 PR NO CHARGE NURSE ONLY: CPT

## 2020-11-16 PROCEDURE — U0003 INFECTIOUS AGENT DETECTION BY NUCLEIC ACID (DNA OR RNA); SEVERE ACUTE RESPIRATORY SYNDROME CORONAVIRUS 2 (SARS-COV-2) (CORONAVIRUS DISEASE [COVID-19]), AMPLIFIED PROBE TECHNIQUE, MAKING USE OF HIGH THROUGHPUT TECHNOLOGIES AS DESCRIBED BY CMS-2020-01-R: HCPCS | Mod: ZL

## 2020-11-16 PROCEDURE — C9803 HOPD COVID-19 SPEC COLLECT: HCPCS

## 2020-11-16 NOTE — NURSING NOTE
"Chief Complaint   Patient presents with     Covid 19 Testing   Patient swabbed for COVID-19 testing.  Corrina Paniagua LPN on 11/16/2020 at 12:07 PM      Initial There were no vitals taken for this visit. Estimated body mass index is 26.73 kg/m  as calculated from the following:    Height as of 5/2/19: 1.753 m (5' 9\").    Weight as of 8/25/20: 82.1 kg (181 lb).  Medication Reconciliation: complete    Corrina Paniagua LPN  "

## 2020-11-17 LAB
SARS-COV-2 RNA SPEC QL NAA+PROBE: NOT DETECTED
SPECIMEN SOURCE: NORMAL

## 2020-11-18 ENCOUNTER — NURSE TRIAGE (OUTPATIENT)
Dept: FAMILY MEDICINE | Facility: OTHER | Age: 35
End: 2020-11-18

## 2020-11-18 NOTE — TELEPHONE ENCOUNTER
If he is getting worse and the covid test was neg  Their may be something else going on and should probably be seen

## 2020-11-18 NOTE — TELEPHONE ENCOUNTER
S-(situation): Pt negative for COVID; c/o multiple new symptoms.    B-(background): Lost sense of taste (denies loss of smell) 11/15, tested 11/16 for COVID with NEGATIVE result 11/17. No flu shot.    A-(assessment): Persisting, intermittent fever (102F, breaking to 99F without use of fever-reducing medications) and loss of taste, with additional sx developing 11/16 afternoon, including: headache (currently mild, located in forehead, more severe with fever), fatigue, chills, back ache (has been laying on cough past 3 days), dry mouth, and bilateral numbness/cold sensation in hands and feet. 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 Denies: SOB, cough, sore throat, sinus congestion, neck stiffness, dysuria, dark/cloudy urine, dehydration (last urinated recently, drinking 80 oz. Water/24 hrs), rash, diarrhea, vomiting, abdominal pain    R-(recommendations): Protocol recommends Pt be seen Thursday or Friday, if fever persists through tomorrow. Pt requesting review by wilson Sanchez for advisement or consideration of re-testing for COVID. Pt requesting call- ok to leave detailed message.    Celia Pimentel RN .............. 11/18/2020  1:21 PM      ___________________________________________________________________        Additional Information    Negative: Difficult to awaken or acting confused (e.g., disoriented, slurred speech)    Negative: Pale cold skin and very weak (can't stand)    Negative: Difficulty breathing and bluish (or gray) lips or face    Negative: New onset rash with purple (or blood-colored) spots or dots    Negative: Sounds like a life-threatening emergency to the triager    Negative: Fever onset within 24 hours of receiving VACCINE    Negative: Fever within 21 days of Ebola EXPOSURE    Negative: Postpartum (from 0 to 6 weeks after  delivery)    Negative: Headache and stiff neck (can't touch chin to chest)    Negative: Difficulty breathing    Negative: IV drug abuse    Negative: Fever > 103 F (39.4 C)    Negative: Fever > 101 F (38.3 C) and over 60 years of age    Negative: Fever > 100.0 F (37.8 C) and diabetes mellitus or a weak immune system (e.g., HIV positive, chemotherapy, splenectomy)    Negative: Fever > 100.0 F (37.8 C) and bedridden (e.g., nursing home patient, stroke, chronic illness, recovering from surgery)    Negative: Fever > 100.0 F (37.8 C) and indwelling urinary catheter (e.g., Betancourt, coude)    Negative: Fever > 100.5 F (38.1 C) and has port (portacath), central line, or PICC line    Negative: Drinking very little and has signs of dehydration (e.g., no urine > 12 hours, very dry mouth, very lightheaded)    Negative: Patient sounds very sick or weak to the triager    Negative: Fever > 100.5 F (38.1 C) and surgery in the past month    Negative: Transplant patient (e.g., liver, heart, lung, kidney)    Negative: Widespread rash and cause unknown    Protocols used: FEVER-A-OH

## 2020-11-18 NOTE — TELEPHONE ENCOUNTER
States he was tested and was negative for covid, but he still has a fever, sometimes up to 102 that breaks a couple times a day.  Has no taste or smell.

## 2020-11-18 NOTE — TELEPHONE ENCOUNTER
Patient was notified of Dr. Oglesby's recommendation.    Tatyana Pepper LPN on 11/18/2020 at 4:40 PM

## 2020-11-19 ENCOUNTER — OFFICE VISIT (OUTPATIENT)
Dept: FAMILY MEDICINE | Facility: OTHER | Age: 35
End: 2020-11-19
Attending: FAMILY MEDICINE
Payer: COMMERCIAL

## 2020-11-19 VITALS
WEIGHT: 171.6 LBS | TEMPERATURE: 96.6 F | RESPIRATION RATE: 20 BRPM | SYSTOLIC BLOOD PRESSURE: 106 MMHG | DIASTOLIC BLOOD PRESSURE: 68 MMHG | BODY MASS INDEX: 25.34 KG/M2 | HEART RATE: 94 BPM | OXYGEN SATURATION: 97 %

## 2020-11-19 DIAGNOSIS — R50.9 FEVER IN ADULT: Primary | ICD-10-CM

## 2020-11-19 LAB
FLUAV+FLUBV RNA SPEC QL NAA+PROBE: NEGATIVE
FLUAV+FLUBV RNA SPEC QL NAA+PROBE: NEGATIVE
RSV RNA SPEC NAA+PROBE: NEGATIVE
SPECIMEN SOURCE: NORMAL

## 2020-11-19 PROCEDURE — 99213 OFFICE O/P EST LOW 20 MIN: CPT | Performed by: FAMILY MEDICINE

## 2020-11-19 PROCEDURE — 87631 RESP VIRUS 3-5 TARGETS: CPT | Mod: ZL | Performed by: FAMILY MEDICINE

## 2020-11-19 PROCEDURE — G0463 HOSPITAL OUTPT CLINIC VISIT: HCPCS

## 2020-11-19 ASSESSMENT — PAIN SCALES - GENERAL: PAINLEVEL: NO PAIN (0)

## 2020-11-19 NOTE — PROGRESS NOTES
SUBJECTIVE:   Davon Nash is a 35 year old male who presents to clinic today for the following health issues: Ongoing fevers    Patient arrives here for ongoing fevers.  Denies a cough or headache.  Recent Covid test was negative.  Today he states he is feeling a little better.  Does not know of any exposures.  He did have 1 day of diarrhea.  Initially had lost his taste.  But that has improved a little bit.  He takes ibuprofen        Patient Active Problem List    Diagnosis Date Noted     Chronic right shoulder pain 07/28/2020     Priority: Medium     Lipoma of skin and subcutaneous tissue 07/28/2020     Priority: Medium     Psoriasis 07/28/2020     Priority: Medium     Past Medical History:   Diagnosis Date     Psoriasis 2010      Current Outpatient Medications   Medication Sig Dispense Refill     calcipotriene (DOVONOX) 0.005 % external ointment Apply to the affected areas twice daily, maximum 100 g/week (Patient not taking: Reported on 7/27/2020) 120 g 3     HUMIRA *CF* PEN 40 MG/0.4ML pen kit        ketoconazole (NIZORAL) 2 % external cream Apply topically daily (Patient not taking: Reported on 7/27/2020) 30 g 0     triamcinolone (KENALOG) 0.1 % external ointment Apply topically 2 times daily (Patient not taking: Reported on 7/27/2020) 80 g 0     Allergies   Allergen Reactions     Hydrocortisone      Other reaction(s): Irritation At Inj Site       Review of Systems     OBJECTIVE:     /68   Pulse 94   Temp 96.6  F (35.9  C)   Resp 20   Wt 77.8 kg (171 lb 9.6 oz)   SpO2 97%   BMI 25.34 kg/m    Body mass index is 25.34 kg/m .  Physical Exam  Constitutional:       Appearance: Normal appearance.   HENT:      Right Ear: Tympanic membrane normal.      Left Ear: Tympanic membrane normal.      Mouth/Throat:      Mouth: Mucous membranes are moist.      Pharynx: Oropharynx is clear. No oropharyngeal exudate or posterior oropharyngeal erythema.   Cardiovascular:      Rate and Rhythm: Normal rate and  regular rhythm.      Heart sounds: No murmur.   Pulmonary:      Effort: Pulmonary effort is normal.      Breath sounds: Normal breath sounds.   Neurological:      Mental Status: He is alert.         Diagnostic Test Results:  Results for orders placed or performed in visit on 11/19/20   Influenza A and B and RSV PCR     Status: None   Result Value Ref Range    Specimen Description Nasopharyngeal     Influenza A PCR Negative NEG^Negative    Influenza B PCR Negative NEG^Negative    Resp Syncytial Virus Negative NEG^Negative         ASSESSMENT/PLAN:         1. Fever in adult  With a Covid test negative influenza negative possible other viral illness.  I also discussed that certainly could be a false negative for Covid.  Supportive care.  Follow-up if getting worse  - Influenza A and B and RSV PCR      Vladimir Oglesby MD  [facility]

## 2020-11-19 NOTE — NURSING NOTE
Patient here for fever that broke this morning. This started 4 days prior, he has a negative COVID, taking Ibuprofen 600 mg this morning. States this is the best he has felt all week. Medication Reconciliation: complete.    Matilde Brown LPN  11/19/2020 10:09 AM

## 2020-11-20 ENCOUNTER — OFFICE VISIT (OUTPATIENT)
Dept: FAMILY MEDICINE | Facility: OTHER | Age: 35
End: 2020-11-20
Attending: NURSE PRACTITIONER
Payer: COMMERCIAL

## 2020-11-20 VITALS
BODY MASS INDEX: 25.45 KG/M2 | SYSTOLIC BLOOD PRESSURE: 120 MMHG | TEMPERATURE: 98.8 F | RESPIRATION RATE: 19 BRPM | HEART RATE: 102 BPM | DIASTOLIC BLOOD PRESSURE: 80 MMHG | WEIGHT: 171.8 LBS | OXYGEN SATURATION: 92 % | HEIGHT: 69 IN

## 2020-11-20 DIAGNOSIS — R51.9 NONINTRACTABLE HEADACHE, UNSPECIFIED CHRONICITY PATTERN, UNSPECIFIED HEADACHE TYPE: ICD-10-CM

## 2020-11-20 DIAGNOSIS — R50.9 FEVER AND CHILLS: Primary | ICD-10-CM

## 2020-11-20 LAB
BASOPHILS # BLD AUTO: 0 10E9/L (ref 0–0.2)
BASOPHILS NFR BLD AUTO: 0.9 %
DIFFERENTIAL METHOD BLD: ABNORMAL
EOSINOPHIL # BLD AUTO: 0 10E9/L (ref 0–0.7)
EOSINOPHIL NFR BLD AUTO: 0 %
ERYTHROCYTE [DISTWIDTH] IN BLOOD BY AUTOMATED COUNT: 12.2 % (ref 10–15)
HCT VFR BLD AUTO: 48.2 % (ref 40–53)
HGB BLD-MCNC: 16.9 G/DL (ref 13.3–17.7)
IMM GRANULOCYTES # BLD: 0 10E9/L (ref 0–0.4)
IMM GRANULOCYTES NFR BLD: 0.2 %
LYMPHOCYTES # BLD AUTO: 2.5 10E9/L (ref 0.8–5.3)
LYMPHOCYTES NFR BLD AUTO: 58.7 %
MCH RBC QN AUTO: 29.9 PG (ref 26.5–33)
MCHC RBC AUTO-ENTMCNC: 35.1 G/DL (ref 31.5–36.5)
MCV RBC AUTO: 85 FL (ref 78–100)
MONOCYTES # BLD AUTO: 0.3 10E9/L (ref 0–1.3)
MONOCYTES NFR BLD AUTO: 8 %
NEUTROPHILS # BLD AUTO: 1.4 10E9/L (ref 1.6–8.3)
NEUTROPHILS NFR BLD AUTO: 32.2 %
PLATELET # BLD AUTO: 68 10E9/L (ref 150–450)
RBC # BLD AUTO: 5.66 10E12/L (ref 4.4–5.9)
WBC # BLD AUTO: 4.2 10E9/L (ref 4–11)

## 2020-11-20 PROCEDURE — 99213 OFFICE O/P EST LOW 20 MIN: CPT | Performed by: NURSE PRACTITIONER

## 2020-11-20 PROCEDURE — 85025 COMPLETE CBC W/AUTO DIFF WBC: CPT | Mod: ZL | Performed by: NURSE PRACTITIONER

## 2020-11-20 PROCEDURE — 87798 DETECT AGENT NOS DNA AMP: CPT | Mod: ZL | Performed by: NURSE PRACTITIONER

## 2020-11-20 PROCEDURE — U0003 INFECTIOUS AGENT DETECTION BY NUCLEIC ACID (DNA OR RNA); SEVERE ACUTE RESPIRATORY SYNDROME CORONAVIRUS 2 (SARS-COV-2) (CORONAVIRUS DISEASE [COVID-19]), AMPLIFIED PROBE TECHNIQUE, MAKING USE OF HIGH THROUGHPUT TECHNOLOGIES AS DESCRIBED BY CMS-2020-01-R: HCPCS | Mod: ZL | Performed by: NURSE PRACTITIONER

## 2020-11-20 PROCEDURE — 36415 COLL VENOUS BLD VENIPUNCTURE: CPT | Mod: ZL | Performed by: NURSE PRACTITIONER

## 2020-11-20 PROCEDURE — 86618 LYME DISEASE ANTIBODY: CPT | Mod: ZL | Performed by: NURSE PRACTITIONER

## 2020-11-20 PROCEDURE — G0463 HOSPITAL OUTPT CLINIC VISIT: HCPCS

## 2020-11-20 PROCEDURE — C9803 HOPD COVID-19 SPEC COLLECT: HCPCS

## 2020-11-20 SDOH — HEALTH STABILITY: MENTAL HEALTH: HOW OFTEN DO YOU HAVE 6 OR MORE DRINKS ON ONE OCCASION?: NOT ASKED

## 2020-11-20 SDOH — HEALTH STABILITY: MENTAL HEALTH: HOW MANY STANDARD DRINKS CONTAINING ALCOHOL DO YOU HAVE ON A TYPICAL DAY?: NOT ASKED

## 2020-11-20 SDOH — HEALTH STABILITY: MENTAL HEALTH: HOW OFTEN DO YOU HAVE A DRINK CONTAINING ALCOHOL?: 2-4 TIMES A MONTH

## 2020-11-20 ASSESSMENT — PAIN SCALES - GENERAL: PAINLEVEL: NO PAIN (0)

## 2020-11-20 ASSESSMENT — MIFFLIN-ST. JEOR: SCORE: 1694.66

## 2020-11-20 NOTE — NURSING NOTE
"Chief Complaint   Patient presents with     Headache     right side     Fever     100 earlier today     Patient presents to clinic with headache and fever. He states he has had these symptoms since Monday, but has tested negative for covid, flu, etc. His fever was over 100 this morning before taking ibuprofen.     Initial /80 (BP Location: Right arm, Patient Position: Sitting, Cuff Size: Adult Regular)   Pulse 102   Temp 98.8  F (37.1  C) (Tympanic)   Resp 19   Ht 1.753 m (5' 9\")   Wt 77.9 kg (171 lb 12.8 oz)   SpO2 92%   BMI 25.37 kg/m   Estimated body mass index is 25.37 kg/m  as calculated from the following:    Height as of this encounter: 1.753 m (5' 9\").    Weight as of this encounter: 77.9 kg (171 lb 12.8 oz).    Medication Reconciliation: complete      Lisa Lugo  "

## 2020-11-20 NOTE — PROGRESS NOTES
HPI:    Davon Nash is a 35 year old male who presents to clinic today for fever and headache.  He was tested for COVID-19 on 11/16 which was negative.  He was seen on 11/19/2020 by his primary doctor.  Influenza swab was obtained which was negative.  He was notified that even though he had a negative Covid test this does not mean he does not have Covid.  He denied any cough or headache yesterday.  He was actually starting to feel little bit better yesterday.  Comes in today with fever up to 100 this morning and headache on the right side of his head.  he questions if he should have tickborne illness testing today as he does process deer daily.    Past Medical History:   Diagnosis Date     Psoriasis 2010       History reviewed. No pertinent surgical history.    Family History   Problem Relation Age of Onset     Genetic Disorder Other         Genetic,None noted       Social History     Socioeconomic History     Marital status: Single     Spouse name: Not on file     Number of children: Not on file     Years of education: Not on file     Highest education level: Not on file   Occupational History     Not on file   Social Needs     Financial resource strain: Not on file     Food insecurity     Worry: Not on file     Inability: Not on file     Transportation needs     Medical: Not on file     Non-medical: Not on file   Tobacco Use     Smoking status: Current Every Day Smoker     Smokeless tobacco: Current User   Substance and Sexual Activity     Alcohol use: Yes     Frequency: 2-4 times a month     Drug use: Not Currently     Sexual activity: Yes     Partners: Female   Lifestyle     Physical activity     Days per week: Not on file     Minutes per session: Not on file     Stress: Not on file   Relationships     Social connections     Talks on phone: Not on file     Gets together: Not on file     Attends [employer] service: Not on file     Active member of club or organization: Not on file     Attends meetings of clubs  "or organizations: Not on file     Relationship status: Not on file     Intimate partner violence     Fear of current or ex partner: Not on file     Emotionally abused: Not on file     Physically abused: Not on file     Forced sexual activity: Not on file   Other Topics Concern     Not on file   Social History Narrative    None noted    Preloaded 3/25/2013.       Current Outpatient Medications   Medication Sig Dispense Refill     calcipotriene (DOVONOX) 0.005 % external ointment Apply to the affected areas twice daily, maximum 100 g/week (Patient not taking: Reported on 7/27/2020) 120 g 3     HUMIRA *CF* PEN 40 MG/0.4ML pen kit        ketoconazole (NIZORAL) 2 % external cream Apply topically daily (Patient not taking: Reported on 7/27/2020) 30 g 0     triamcinolone (KENALOG) 0.1 % external ointment Apply topically 2 times daily (Patient not taking: Reported on 7/27/2020) 80 g 0       Allergies   Allergen Reactions     Hydrocortisone      Other reaction(s): Irritation At Inj Site       ROS:  Pertinent positives and negatives are noted in HPI.    EXAM:  /80 (BP Location: Right arm, Patient Position: Sitting, Cuff Size: Adult Regular)   Pulse 102   Temp 98.8  F (37.1  C) (Tympanic)   Resp 19   Ht 1.753 m (5' 9\")   Wt 77.9 kg (171 lb 12.8 oz)   SpO2 92%   BMI 25.37 kg/m    General appearance: well appearing male, in no acute distress  Head: normocephalic, atraumatic  Ears: TM's with cone of light, no erythema, canals clear bilaterally  Eyes: conjunctivae normal  Oropharynx: moist mucous membranes, tonsils without erythema, exudates or petechiae, no post nasal drip seen  Neck: supple without adenopathy  Respiratory: clear to auscultation bilaterally  Cardiac: RRR with no murmurs  Dermatological: no rashes or lesions  Psychological: normal affect, alert and pleasant  Results for orders placed or performed in visit on 11/20/20   Lyme Disease Ab with reflex to WB Serum     Status: None   Result Value Ref Range "    Lyme Disease Antibodies Serum 0.22 0.00 - 0.89   CBC and Differential     Status: Abnormal   Result Value Ref Range    WBC 4.2 4.0 - 11.0 10e9/L    RBC Count 5.66 4.4 - 5.9 10e12/L    Hemoglobin 16.9 13.3 - 17.7 g/dL    Hematocrit 48.2 40.0 - 53.0 %    MCV 85 78 - 100 fl    MCH 29.9 26.5 - 33.0 pg    MCHC 35.1 31.5 - 36.5 g/dL    RDW 12.2 10.0 - 15.0 %    Platelet Count 68 (L) 150 - 450 10e9/L    Diff Method Automated Method     % Neutrophils 32.2 %    % Lymphocytes 58.7 %    % Monocytes 8.0 %    % Eosinophils 0.0 %    % Basophils 0.9 %    % Immature Granulocytes 0.2 %    Absolute Neutrophil 1.4 (L) 1.6 - 8.3 10e9/L    Absolute Lymphocytes 2.5 0.8 - 5.3 10e9/L    Absolute Monocytes 0.3 0.0 - 1.3 10e9/L    Absolute Eosinophils 0.0 0.0 - 0.7 10e9/L    Absolute Basophils 0.0 0.0 - 0.2 10e9/L    Abs Immature Granulocytes 0.0 0 - 0.4 10e9/L   Symptomatic COVID-19 Virus (Coronavirus) by PCR     Status: None    Specimen: Nasopharyngeal   Result Value Ref Range    COVID-19 Virus PCR to [facility] - Source Nasopharyngeal     COVID-19 Virus PCR to [facility] - Result Not Detected      ASSESSMENT AND PLAN:    1. Fever and chills    2. Nonintractable headache, unspecified chronicity pattern, unspecified headache type        CBC is stable, Covid test was obtained and was negative, Lyme test is negative.  Symptoms are likely viral in nature.  Discussed ongoing symptomatic management and signs and symptoms that would warrant follow-up.    Uyen Ingram, KARY CNP..................11/20/2020 10:53 AM      This document was prepared using voice generated software.  While every attempt was made for accuracy, grammatical errors may exist.

## 2020-11-21 LAB
B BURGDOR IGG+IGM SER QL: 0.22 (ref 0–0.89)
SARS-COV-2 RNA SPEC QL NAA+PROBE: NOT DETECTED
SPECIMEN SOURCE: NORMAL

## 2020-11-27 NOTE — PROGRESS NOTES
Outpatient Physical Therapy Discharge Note     Patient: Davon Nash  : 1985    Beginning/End Dates of Reporting Period:  2020 to 2020    Referring Provider: Dr. Pendleton    Therapy Diagnosis: Chronic R shoulder pain     Client Self Report: This is pts last scheduled appointment. He is seeing Dr. Pendleton to discuss his shoulder moving forward. His injection has still given him quite a bit of pain relief. Has concerns about possible surgery.    Goals:  Goal Identifier Posture   Goal Description Pt will demonstrate improved posture with more retracted shoulders to allow for more GH clearance during overhead movements.   Target Date 10/24/20   Date Met      Progress:     Goal Identifier Work tolerance   Goal Description Pt will be able to complete full work shift and all duties without increasing pain in 4 weeks   Target Date 10/24/20   Date Met      Progress:     Goal Identifier Strength   Goal Description Pt will be able to lift 75 lbs from floor to shoulder height shelf in order to perform job duties.   Target Date 10/24/20   Date Met      Progress:       Progress Toward Goals:   Not assessed this period. No formal assessment completed. Pt had gotten quite a bit of relief from his injection that he got and he was continuing to work through the strengthening process. Pt was seen for 3 visits and some small progress was seen during that time with posture and stretching. Likely his improvement in pain was due to the injection.       Plan:  Discharge from therapy.    Discharge:    Reason for Discharge: Pt was to follow up with his surgeon to see if continuing therapy would be recommend or if surgical intervention was the direction he wished to pursue. Pt never re-established contact with the Rehab Department. Pt will be discharged.    Equipment Issued: Theraband    Discharge Plan: Patient to continue home program.

## 2020-11-29 LAB
A PHAGOCYTOPH DNA BLD QL NAA+PROBE: DETECTED
E CHAFFEENSIS DNA BLD QL NAA+PROBE: NOT DETECTED
E EWINGII DNA SPEC QL NAA+PROBE: NOT DETECTED
EHRLICHIA DNA SPEC QL NAA+PROBE: NOT DETECTED

## 2020-11-30 DIAGNOSIS — A77.49 ANAPLASMOSIS: Primary | ICD-10-CM

## 2020-11-30 RX ORDER — DOXYCYCLINE HYCLATE 100 MG
100 TABLET ORAL 2 TIMES DAILY
Qty: 20 TABLET | Refills: 0 | Status: SHIPPED | OUTPATIENT
Start: 2020-11-30 | End: 2020-12-10

## 2020-12-27 ENCOUNTER — HEALTH MAINTENANCE LETTER (OUTPATIENT)
Age: 35
End: 2020-12-27

## 2021-04-26 DIAGNOSIS — M25.512 LEFT SHOULDER PAIN, UNSPECIFIED CHRONICITY: Primary | ICD-10-CM

## 2021-04-27 ENCOUNTER — HOSPITAL ENCOUNTER (OUTPATIENT)
Dept: GENERAL RADIOLOGY | Facility: OTHER | Age: 36
End: 2021-04-27
Attending: ORTHOPAEDIC SURGERY
Payer: COMMERCIAL

## 2021-04-27 ENCOUNTER — OFFICE VISIT (OUTPATIENT)
Dept: ORTHOPEDICS | Facility: OTHER | Age: 36
End: 2021-04-27
Attending: ORTHOPAEDIC SURGERY
Payer: COMMERCIAL

## 2021-04-27 DIAGNOSIS — M25.512 LEFT SHOULDER PAIN, UNSPECIFIED CHRONICITY: ICD-10-CM

## 2021-04-27 DIAGNOSIS — M25.511 RIGHT SHOULDER PAIN, UNSPECIFIED CHRONICITY: ICD-10-CM

## 2021-04-27 DIAGNOSIS — M25.552 HIP PAIN, LEFT: Primary | ICD-10-CM

## 2021-04-27 PROCEDURE — 250N000009 HC RX 250: Performed by: ORTHOPAEDIC SURGERY

## 2021-04-27 PROCEDURE — 73030 X-RAY EXAM OF SHOULDER: CPT | Mod: LT

## 2021-04-27 PROCEDURE — 20610 DRAIN/INJ JOINT/BURSA W/O US: CPT | Mod: 50 | Performed by: ORTHOPAEDIC SURGERY

## 2021-04-27 PROCEDURE — G0463 HOSPITAL OUTPT CLINIC VISIT: HCPCS | Mod: 25

## 2021-04-27 PROCEDURE — 250N000011 HC RX IP 250 OP 636: Performed by: ORTHOPAEDIC SURGERY

## 2021-04-27 PROCEDURE — G0463 HOSPITAL OUTPT CLINIC VISIT: HCPCS

## 2021-04-27 PROCEDURE — 99213 OFFICE O/P EST LOW 20 MIN: CPT | Mod: 25 | Performed by: ORTHOPAEDIC SURGERY

## 2021-04-27 RX ORDER — LIDOCAINE HYDROCHLORIDE 10 MG/ML
4 INJECTION, SOLUTION EPIDURAL; INFILTRATION; INTRACAUDAL; PERINEURAL ONCE
Status: COMPLETED | OUTPATIENT
Start: 2021-04-27 | End: 2021-04-27

## 2021-04-27 RX ORDER — TRIAMCINOLONE ACETONIDE 40 MG/ML
40 INJECTION, SUSPENSION INTRA-ARTICULAR; INTRAMUSCULAR ONCE
Status: COMPLETED | OUTPATIENT
Start: 2021-04-27 | End: 2021-04-27

## 2021-04-27 RX ADMIN — LIDOCAINE HYDROCHLORIDE 4 ML: 10 INJECTION, SOLUTION EPIDURAL; INFILTRATION; INTRACAUDAL; PERINEURAL at 09:28

## 2021-04-27 RX ADMIN — TRIAMCINOLONE ACETONIDE 40 MG: 40 INJECTION, SUSPENSION INTRA-ARTICULAR; INTRAMUSCULAR at 09:28

## 2021-04-27 NOTE — PROGRESS NOTES
Visit Date: 04/27/2021    REASON FOR EVALUATION:   1.  Bilateral shoulder pain, left side, new.  2.  Left hip pain.      HISTORY OF PRESENT ILLNESS:  Davon comes in, wants a right shoulder injection.  Has had previous injection for impingement.  Is now reporting increasing left shoulder pain.  Wanted to have x-rays done and evaluation there.  Reports pain with overhead activity as well as reaching out and reaching across at this time.  Did x-rays here today, shows some AC arthrosis.  He is here to look at injection for that both shoulders as well as the contralateral at this point in time.  Denies any falls or injuries in the interim at this point in time.  Also reports left hip pain.  The intraarticular injection with Dr. Hooper certainly helped him out to a significant level there as well.  Pain with overhead activity, reaching out, reaching across.  He is one of the owners of [employer] and meets the repetitive motion there, certainly causes irritation.  No changes in medical history as well as medications or allergies.    PHYSICAL EXAMINATION:    GENERAL:  The patient is alert and oriented x3, cooperative and pleasant on exam.  In no acute distress.  Does ambulate with satisfactory gait.  Affect appropriate as well.   MUSCULOSKELETAL:  Examination of both shoulders shows full range of motion.  Pain with abduction.  Pain with impingement testing.  Mild crepitation with flexion and extension.  Neurovascular examination is otherwise intact.  No instability is seen for either site at this point in time here, as well.  Rotator cuff strength testing is 5/5 for both shoulders at this time.  Left hip shows pain with hip flexion and internal rotation.      X-rays were reviewed, 4 views of the left shoulder shows type 2 acromion, mild AC arthrosis.    PROCEDURE PERFORMED:  Following informed consent and sterile preparation, the patient's bilateral shoulder subacromial spaces were injected with 4 mL of 1% lidocaine and 40  mg of Kenalog under sterile conditions.  The patient did tolerate the procedure well.    IMPRESSION:    1.  Bilateral shoulder impingement with cuff tendinitis.  2.  Left hip impingement.      PLAN:  At this time, advised the patient get set forward on bilateral shoulder injection subacromial spaces.  We will also get him coordinated for left hip intra-articular injection in addition to that.  Long-term manage if symptoms continue to progress is certainly looking at a shoulder arthroscopy and decompression, distal clavicle resection would be the recommendation in that scenario.  The patient is in agreement with that plan otherwise.    Joe Pendleton MD        D: 2021   T: 2021   MT: [id number]    Name:     TAZ JOHNGracia  MRN:      [id number]        Account:    [id number]   :      1985           Visit Date: 2021     Document: [id number]

## 2021-04-27 NOTE — PROGRESS NOTES
Patient is here for consult on his left shoulder pain.  Janey Schumacher LPN .....................4/27/2021 9:02 AM

## 2021-05-11 ENCOUNTER — HOSPITAL ENCOUNTER (OUTPATIENT)
Dept: GENERAL RADIOLOGY | Facility: OTHER | Age: 36
Discharge: HOME | End: 2021-05-11
Attending: ORTHOPAEDIC SURGERY | Admitting: ORTHOPAEDIC SURGERY
Payer: COMMERCIAL

## 2021-05-11 DIAGNOSIS — M25.552 HIP PAIN, LEFT: ICD-10-CM

## 2021-05-11 PROCEDURE — 250N000011 HC RX IP 250 OP 636: Performed by: RADIOLOGY

## 2021-05-11 PROCEDURE — 255N000002 HC RX 255 OP 636: Performed by: RADIOLOGY

## 2021-05-11 PROCEDURE — 250N000009 HC RX 250: Performed by: RADIOLOGY

## 2021-05-11 PROCEDURE — 20610 DRAIN/INJ JOINT/BURSA W/O US: CPT | Mod: LT

## 2021-05-11 RX ORDER — LIDOCAINE HYDROCHLORIDE 10 MG/ML
2 INJECTION, SOLUTION INFILTRATION; PERINEURAL ONCE
Status: COMPLETED | OUTPATIENT
Start: 2021-05-11 | End: 2021-05-11

## 2021-05-11 RX ORDER — BUPIVACAINE HYDROCHLORIDE 5 MG/ML
3 INJECTION, SOLUTION EPIDURAL; INTRACAUDAL ONCE
Status: COMPLETED | OUTPATIENT
Start: 2021-05-11 | End: 2021-05-11

## 2021-05-11 RX ORDER — TRIAMCINOLONE ACETONIDE 40 MG/ML
40 INJECTION, SUSPENSION INTRA-ARTICULAR; INTRAMUSCULAR ONCE
Status: COMPLETED | OUTPATIENT
Start: 2021-05-11 | End: 2021-05-11

## 2021-05-11 RX ADMIN — TRIAMCINOLONE ACETONIDE 40 MG: 40 INJECTION, SUSPENSION INTRA-ARTICULAR; INTRAMUSCULAR at 10:05

## 2021-05-11 RX ADMIN — BUPIVACAINE HYDROCHLORIDE 3 ML: 5 INJECTION, SOLUTION EPIDURAL; INTRACAUDAL at 10:05

## 2021-05-11 RX ADMIN — LIDOCAINE HYDROCHLORIDE 2 ML: 10 INJECTION, SOLUTION INFILTRATION; PERINEURAL at 10:05

## 2021-05-11 RX ADMIN — IOHEXOL 2 ML: 240 INJECTION, SOLUTION INTRATHECAL; INTRAVASCULAR; INTRAVENOUS; ORAL at 10:04

## 2021-10-09 ENCOUNTER — HEALTH MAINTENANCE LETTER (OUTPATIENT)
Age: 36
End: 2021-10-09

## 2022-01-03 DIAGNOSIS — M25.511 CHRONIC RIGHT SHOULDER PAIN: Primary | ICD-10-CM

## 2022-01-03 DIAGNOSIS — G89.29 CHRONIC RIGHT SHOULDER PAIN: Primary | ICD-10-CM

## 2022-01-05 ENCOUNTER — OFFICE VISIT (OUTPATIENT)
Dept: ORTHOPEDICS | Facility: OTHER | Age: 37
End: 2022-01-05
Attending: ORTHOPAEDIC SURGERY
Payer: COMMERCIAL

## 2022-01-05 DIAGNOSIS — M25.511 RIGHT SHOULDER PAIN, UNSPECIFIED CHRONICITY: Primary | ICD-10-CM

## 2022-01-05 DIAGNOSIS — M25.512 LEFT SHOULDER PAIN, UNSPECIFIED CHRONICITY: ICD-10-CM

## 2022-01-05 PROCEDURE — G0463 HOSPITAL OUTPT CLINIC VISIT: HCPCS | Mod: 25

## 2022-01-05 PROCEDURE — 20610 DRAIN/INJ JOINT/BURSA W/O US: CPT | Mod: 50 | Performed by: ORTHOPAEDIC SURGERY

## 2022-01-05 PROCEDURE — 250N000009 HC RX 250: Performed by: ORTHOPAEDIC SURGERY

## 2022-01-05 PROCEDURE — 99212 OFFICE O/P EST SF 10 MIN: CPT | Mod: 25 | Performed by: ORTHOPAEDIC SURGERY

## 2022-01-05 PROCEDURE — G0463 HOSPITAL OUTPT CLINIC VISIT: HCPCS

## 2022-01-05 PROCEDURE — 250N000011 HC RX IP 250 OP 636: Performed by: ORTHOPAEDIC SURGERY

## 2022-01-05 RX ORDER — LIDOCAINE HYDROCHLORIDE 10 MG/ML
4 INJECTION, SOLUTION EPIDURAL; INFILTRATION; INTRACAUDAL; PERINEURAL ONCE
Status: COMPLETED | OUTPATIENT
Start: 2022-01-05 | End: 2022-01-05

## 2022-01-05 RX ORDER — TRIAMCINOLONE ACETONIDE 40 MG/ML
40 INJECTION, SUSPENSION INTRA-ARTICULAR; INTRAMUSCULAR ONCE
Status: COMPLETED | OUTPATIENT
Start: 2022-01-05 | End: 2022-01-05

## 2022-01-05 RX ADMIN — TRIAMCINOLONE ACETONIDE 40 MG: 40 INJECTION, SUSPENSION INTRA-ARTICULAR; INTRAMUSCULAR at 09:32

## 2022-01-05 RX ADMIN — LIDOCAINE HYDROCHLORIDE 4 ML: 10 INJECTION, SOLUTION INFILTRATION; PERINEURAL at 09:33

## 2022-01-05 ASSESSMENT — PAIN SCALES - GENERAL: PAINLEVEL: EXTREME PAIN (8)

## 2022-01-05 NOTE — PROGRESS NOTES
Visit Date: 2022    REASON FOR EVALUATION:  Bilateral shoulder pain.    HISTORY OF PRESENT ILLNESS:  Davon comes in with bilateral shoulder pain that has been ongoing here for a period of time.  He is here for bilateral shoulder injection, left side more symptomatic than right at this point.  He has been symptomatic with any kind of overhead activity, reaching out, reaching across and painful with regards to that.  Injections have worked well for him; last injection was done in April.    PHYSICAL EXAMINATION:  Examination of the shoulder shows full range of motion.  Pain with elevation to about 90.  Pain with impingement testing.  Neurovascular examination is otherwise intact.  Some mild weakness seen there as well in addition to that.    PROCEDURE PERFORMED:  Following informed consent and sterile preparation, the patient's bilateral shoulder subacromial space was injected with 4 mL of 1% lidocaine and 40 mg of Kenalog under sterile conditions.  The patient did tolerate the procedure well.    IMPRESSION:  Bilateral shoulder cuff tendinitis, impingement.    PLAN:  Injection as stated above.  Repeat in the future as appropriate and necessary.  Certainly, long-term manage will consist of shoulder arthroscopy, decompression, and cuff evaluation moving forward if necessary.    Joe Pendleton MD        D: 2022   T: 2022   MT: [email or web address]    Name:     DAVON JOHN  MRN:      [id number]        Account:    [id number]   :      1985           Visit Date: 2022     Document: [id number]

## 2022-01-29 ENCOUNTER — HEALTH MAINTENANCE LETTER (OUTPATIENT)
Age: 37
End: 2022-01-29

## 2022-02-10 ENCOUNTER — ALLIED HEALTH/NURSE VISIT (OUTPATIENT)
Dept: FAMILY MEDICINE | Facility: OTHER | Age: 37
End: 2022-02-10
Attending: FAMILY MEDICINE
Payer: COMMERCIAL

## 2022-02-10 DIAGNOSIS — Z20.822 COVID-19 RULED OUT: Primary | ICD-10-CM

## 2022-02-10 PROCEDURE — C9803 HOPD COVID-19 SPEC COLLECT: HCPCS

## 2022-02-10 PROCEDURE — U0003 INFECTIOUS AGENT DETECTION BY NUCLEIC ACID (DNA OR RNA); SEVERE ACUTE RESPIRATORY SYNDROME CORONAVIRUS 2 (SARS-COV-2) (CORONAVIRUS DISEASE [COVID-19]), AMPLIFIED PROBE TECHNIQUE, MAKING USE OF HIGH THROUGHPUT TECHNOLOGIES AS DESCRIBED BY CMS-2020-01-R: HCPCS | Mod: ZL

## 2022-02-12 LAB — SARS-COV-2 RNA RESP QL NAA+PROBE: NEGATIVE

## 2022-09-17 ENCOUNTER — HEALTH MAINTENANCE LETTER (OUTPATIENT)
Age: 37
End: 2022-09-17

## 2023-05-06 ENCOUNTER — HEALTH MAINTENANCE LETTER (OUTPATIENT)
Age: 38
End: 2023-05-06

## 2023-09-06 ENCOUNTER — OFFICE VISIT (OUTPATIENT)
Dept: ORTHOPEDICS | Facility: OTHER | Age: 38
End: 2023-09-06
Attending: ORTHOPAEDIC SURGERY
Payer: COMMERCIAL

## 2023-09-06 DIAGNOSIS — M25.511 RIGHT SHOULDER PAIN, UNSPECIFIED CHRONICITY: Primary | ICD-10-CM

## 2023-09-06 DIAGNOSIS — M25.512 LEFT SHOULDER PAIN, UNSPECIFIED CHRONICITY: ICD-10-CM

## 2023-09-06 PROCEDURE — 250N000009 HC RX 250: Performed by: ORTHOPAEDIC SURGERY

## 2023-09-06 PROCEDURE — 20610 DRAIN/INJ JOINT/BURSA W/O US: CPT | Mod: 50 | Performed by: ORTHOPAEDIC SURGERY

## 2023-09-06 PROCEDURE — 250N000011 HC RX IP 250 OP 636: Mod: JZ | Performed by: ORTHOPAEDIC SURGERY

## 2023-09-06 PROCEDURE — G0463 HOSPITAL OUTPT CLINIC VISIT: HCPCS | Mod: 25

## 2023-09-06 RX ORDER — LIDOCAINE HYDROCHLORIDE 10 MG/ML
8 INJECTION, SOLUTION INFILTRATION; PERINEURAL ONCE
Status: COMPLETED | OUTPATIENT
Start: 2023-09-06 | End: 2023-09-06

## 2023-09-06 RX ORDER — TRIAMCINOLONE ACETONIDE 40 MG/ML
40 INJECTION, SUSPENSION INTRA-ARTICULAR; INTRAMUSCULAR ONCE
Status: COMPLETED | OUTPATIENT
Start: 2023-09-06 | End: 2023-09-06

## 2023-09-06 RX ADMIN — LIDOCAINE HYDROCHLORIDE 8 ML: 10 INJECTION, SOLUTION INFILTRATION; PERINEURAL at 09:10

## 2023-09-06 RX ADMIN — TRIAMCINOLONE ACETONIDE 40 MG: 40 INJECTION, SUSPENSION INTRA-ARTICULAR; INTRAMUSCULAR at 09:10

## 2023-09-06 RX ADMIN — TRIAMCINOLONE ACETONIDE 40 MG: 40 INJECTION, SUSPENSION INTRA-ARTICULAR; INTRAMUSCULAR at 09:11

## 2023-09-06 NOTE — PROGRESS NOTES
SUBJECTIVE:  Patient returns with bilateral shoulder pain.  Patient was last seen in January 2022 for an injection in both shoulders.  He reports the injections worked quite well for him at this time and like a repeat of that at this time.  Patient denies any limitations in range of motion or changes in strength at this point.    ROS: Musculoskeletal and general review of systems are negative, per review of previous clinic questionnaire.  Denies SOB and calf pain.    EXAM: Bilateral shoulder examination shows full range of motion.  Pain with impingement testing.  No appreciable weakness with clinical rotator cuff strength testing at this time.    PROCEDURE NOTE: Patient's bilateral shoulders are injected with 4 cc of 1% lidocaine and 40 mg of Kenalog under sterile conditions.  Patient tolerated the procedure well into the subacromial space at this time.    IMAGING: None    ASSESSMENT: Bilateral shoulder rotator cuff tendinitis secondary to chronic impingement    PLAN: Bilateral shoulder injections administered here today.  Repeat in the future as necessary.  Long-term and potentially shoulder arthroscopy decompression would be warranted if symptoms do progress.    Joe Pendleton MD

## 2023-12-12 ENCOUNTER — OFFICE VISIT (OUTPATIENT)
Dept: FAMILY MEDICINE | Facility: OTHER | Age: 38
End: 2023-12-12
Attending: PHYSICIAN ASSISTANT
Payer: COMMERCIAL

## 2023-12-12 VITALS
HEART RATE: 65 BPM | WEIGHT: 192.4 LBS | OXYGEN SATURATION: 99 % | SYSTOLIC BLOOD PRESSURE: 136 MMHG | BODY MASS INDEX: 28.41 KG/M2 | RESPIRATION RATE: 16 BRPM | DIASTOLIC BLOOD PRESSURE: 82 MMHG | TEMPERATURE: 97.7 F

## 2023-12-12 DIAGNOSIS — M25.552 HIP PAIN, LEFT: Primary | ICD-10-CM

## 2023-12-12 PROCEDURE — 99213 OFFICE O/P EST LOW 20 MIN: CPT | Performed by: PHYSICIAN ASSISTANT

## 2023-12-12 PROCEDURE — G0463 HOSPITAL OUTPT CLINIC VISIT: HCPCS

## 2023-12-12 ASSESSMENT — PAIN SCALES - GENERAL: PAINLEVEL: NO PAIN (0)

## 2023-12-12 NOTE — NURSING NOTE
"Chief Complaint   Patient presents with    Imm/Inj     Left hip cortisone injection.       Initial /82 (BP Location: Right arm, Patient Position: Sitting, Cuff Size: Adult Regular)   Pulse 65   Temp 97.7  F (36.5  C) (Temporal)   Resp 16   Wt 87.3 kg (192 lb 6.4 oz)   SpO2 99%   BMI 28.41 kg/m   Estimated body mass index is 28.41 kg/m  as calculated from the following:    Height as of 11/20/20: 1.753 m (5' 9\").    Weight as of this encounter: 87.3 kg (192 lb 6.4 oz).  Medication Reconciliation: complete    Tanya Alicea LPN    Advance Care Directive reviewed    "

## 2023-12-12 NOTE — PROGRESS NOTES
Assessment & Plan       ICD-10-CM    1. Hip pain, left  M25.552 XR Joint Injection Major Left        I discussed with Davon unfortunately x-ray guided injections as far as intra-articular injections, were performed only by our radiology team.  I apologized for the confusion/mixup, I am unable to perform this injection.  I am happy to place the order/referral to our radiology team for him to receive an intra-articular hip injection, he is agreeable to this.  Order was placed, he will be contacted to schedule.  I did review apologize for the miscommunication when his visit was scheduled.  Continue with heat, ice and use as tolerated.  May utilize Tylenol and ibuprofen as long as he does not exceed 4000 mg of Tylenol daily and 3200 mg of ibuprofen daily.     Nicotine/Tobacco Cessation:  He reports that he has been smoking. He uses smokeless tobacco.  Nicotine/Tobacco Cessation Plan:   Information offered: Patient not interested at this time      See Patient Instructions    Return for Imaging ordered, you will be contacted to schedule.    Annie Briones PA-C  [facility]    Harrison Mays is a 38 year old, presenting for the following health issues:  Imm/Inj (Left hip cortisone injection.)        12/12/2023    10:29 AM   Additional Questions   Roomed by Tanya Mays presents to the clinic today with left-sided hip pain x 3 to 4 years duration.  This is waxing and waning in severity, he is unsure of any provocative measures as the hip pain occurs on its own.  Currently pain is 0 out of 10 and at worst on average 6 out of 10.  He reports that he limps when pain is prominent.  He does report intermittent numbness, tingling and/or burning into the left hip.  He declines any mechanical symptoms such as snapping, popping or crunching.  He was evaluated by an orthopedic specialist in [facility] and told his hip was of an abnormal shape, this was the likely cause of his pain.  He has had 3 hip  injections in the past which were conducted by radiology which were quite helpful, he is hoping for a x-ray guided injection today.    Utilizing over-the-counter analgesics as needed.  No new falls, injuries and/or trauma.    History of Present Illness       Reason for visit:  Hip    He eats 2-3 servings of fruits and vegetables daily.He consumes 5 sweetened beverage(s) daily.He exercises with enough effort to increase his heart rate 20 to 29 minutes per day.  He exercises with enough effort to increase his heart rate 7 days per week.   He is taking medications regularly.     Review of Systems   Constitutional, HEENT, cardiovascular, pulmonary, GI, , musculoskeletal, neuro, skin, endocrine and psych systems are negative, except as otherwise noted.      Objective    /82 (BP Location: Right arm, Patient Position: Sitting, Cuff Size: Adult Regular)   Pulse 65   Temp 97.7  F (36.5  C) (Temporal)   Resp 16   Wt 87.3 kg (192 lb 6.4 oz)   SpO2 99%   BMI 28.41 kg/m    Body mass index is 28.41 kg/m .  Physical Exam   GENERAL: healthy, alert and no distress  EYES: Eyes grossly normal to inspection, PERRL and conjunctivae and sclerae normal  RESP: lungs clear to auscultation - no rales, rhonchi or wheezes  CV: regular rate and rhythm, normal S1 S2, no S3 or S4, no murmur, click or rub, no peripheral edema and peripheral pulses strong  MS:   Thoracic/Lumbar Spine:  Inspection:    Lordosis: Normal   Kyphosis: Normal  No Tenderness: thoracic spinous processes, thoracic facet joints, left parathoracic muscles, right parathoracic muscles, lumbar spinous processes, lumbar facet joints, left para lumbar muscles, right para lumbar muscles, left SI joint, right SI joint, left sciatic notch, right sciatic notch  ROM: Full flexion, extension, lateral bending and rotation of the thoracolumbar spine  Strength: gastrocsoleus   5/5, hamstrings  5/5, quadriceps  5/5, tibialis anterior  5/5, peroneals  5/5  Special Test: negative  straight leg raises    Patient knows own name, what time of day it is and what building we are in. CNII-XII intact. Gets in and out of chair unassisted.  Sensation at UE and LE intact to light touch.     Bilateral PHYSICAL EXAMINATION:  General Hip Exam: no signs of deformity. Skin intact, no signs of skin changes and/or previous trauma to the hip. Leg length appears to be equal bilaterally.     Gait: Patient is able to rise from a seated position. Patient is able to bear weight. No signs of compensation/guarding in gait. Negative for Trendelenburg gait pattern.     Palpation: no tender to palpation over left greater trochanter, right greater trochanter, left gluteus medius, right gluteus medius, left ASIS, right ASIS, left iliac crest, right iliac crest, left proximal hamstring attachment, right proximal hamstring attachment    ROM:   Flexion while supine: 110* bilaterally   Abduction while supine: 45* bilaterally   Adduction while supine: 20* bilaterally   Extension while in lateral decubitus position: 15* bilaterally   Internal rotation while supine (knee and hip flexed to 90 degrees): 50* bilaterally   External rotation while supine (knee and hip flexed to 90 degrees): 38* bilaterally    Special Testing:   Straight Leg Raise: negative (Lumbar spine)   FREDRICK/Figure 4: negative  (TFL)   Colleen: negative  (IT band)   Gait/Trendelenburg: see above   Leg length: see above    Neurovascular exam: distal pulses and sensation intact.   NEURO: Normal strength and tone, mentation intact and speech normal  BACK: no CVA tenderness, no paralumbar tenderness  PSYCH: mentation appears normal, affect normal/bright

## 2023-12-26 ENCOUNTER — HOSPITAL ENCOUNTER (OUTPATIENT)
Dept: GENERAL RADIOLOGY | Facility: OTHER | Age: 38
Discharge: HOME | End: 2023-12-26
Attending: PHYSICIAN ASSISTANT | Admitting: PHYSICIAN ASSISTANT
Payer: COMMERCIAL

## 2023-12-26 DIAGNOSIS — M25.552 HIP PAIN, LEFT: ICD-10-CM

## 2023-12-26 PROCEDURE — 77002 NEEDLE LOCALIZATION BY XRAY: CPT

## 2023-12-26 PROCEDURE — 255N000002 HC RX 255 OP 636: Performed by: STUDENT IN AN ORGANIZED HEALTH CARE EDUCATION/TRAINING PROGRAM

## 2023-12-26 PROCEDURE — 250N000009 HC RX 250: Performed by: STUDENT IN AN ORGANIZED HEALTH CARE EDUCATION/TRAINING PROGRAM

## 2023-12-26 PROCEDURE — 250N000011 HC RX IP 250 OP 636: Mod: JZ | Performed by: STUDENT IN AN ORGANIZED HEALTH CARE EDUCATION/TRAINING PROGRAM

## 2023-12-26 RX ORDER — TRIAMCINOLONE ACETONIDE 40 MG/ML
40 INJECTION, SUSPENSION INTRA-ARTICULAR; INTRAMUSCULAR ONCE
Status: COMPLETED | OUTPATIENT
Start: 2023-12-26 | End: 2023-12-26

## 2023-12-26 RX ORDER — LIDOCAINE HYDROCHLORIDE 10 MG/ML
20 INJECTION, SOLUTION INFILTRATION; PERINEURAL ONCE
Status: COMPLETED | OUTPATIENT
Start: 2023-12-26 | End: 2023-12-26

## 2023-12-26 RX ORDER — BUPIVACAINE HYDROCHLORIDE 5 MG/ML
10 INJECTION, SOLUTION EPIDURAL; INTRACAUDAL ONCE
Status: COMPLETED | OUTPATIENT
Start: 2023-12-26 | End: 2023-12-26

## 2023-12-26 RX ADMIN — BUPIVACAINE HYDROCHLORIDE 3 ML: 5 INJECTION, SOLUTION EPIDURAL; INTRACAUDAL; PERINEURAL at 10:09

## 2023-12-26 RX ADMIN — IOHEXOL 2 ML: 240 INJECTION, SOLUTION INTRATHECAL; INTRAVASCULAR; INTRAVENOUS; ORAL at 10:08

## 2023-12-26 RX ADMIN — TRIAMCINOLONE ACETONIDE 40 MG: 40 INJECTION, SUSPENSION INTRA-ARTICULAR; INTRAMUSCULAR at 10:10

## 2023-12-26 RX ADMIN — LIDOCAINE HYDROCHLORIDE 4 ML: 10 INJECTION, SOLUTION INFILTRATION; PERINEURAL at 10:09

## 2024-07-13 ENCOUNTER — HEALTH MAINTENANCE LETTER (OUTPATIENT)
Age: 39
End: 2024-07-13

## 2025-07-19 ENCOUNTER — HEALTH MAINTENANCE LETTER (OUTPATIENT)
Age: 40
End: 2025-07-19

## (undated) RX ORDER — LIDOCAINE HYDROCHLORIDE 10 MG/ML
INJECTION, SOLUTION EPIDURAL; INFILTRATION; INTRACAUDAL; PERINEURAL
Status: DISPENSED
Start: 2021-04-27

## (undated) RX ORDER — LIDOCAINE HYDROCHLORIDE 10 MG/ML
INJECTION, SOLUTION INFILTRATION; PERINEURAL
Status: DISPENSED
Start: 2023-12-26

## (undated) RX ORDER — TRIAMCINOLONE ACETONIDE 40 MG/ML
INJECTION, SUSPENSION INTRA-ARTICULAR; INTRAMUSCULAR
Status: DISPENSED
Start: 2020-09-22

## (undated) RX ORDER — LIDOCAINE HYDROCHLORIDE 10 MG/ML
INJECTION, SOLUTION INFILTRATION; PERINEURAL
Status: DISPENSED
Start: 2023-09-06

## (undated) RX ORDER — TRIAMCINOLONE ACETONIDE 40 MG/ML
INJECTION, SUSPENSION INTRA-ARTICULAR; INTRAMUSCULAR
Status: DISPENSED
Start: 2021-04-27

## (undated) RX ORDER — TRIAMCINOLONE ACETONIDE 40 MG/ML
INJECTION, SUSPENSION INTRA-ARTICULAR; INTRAMUSCULAR
Status: DISPENSED
Start: 2023-12-26

## (undated) RX ORDER — LIDOCAINE HYDROCHLORIDE 10 MG/ML
INJECTION, SOLUTION INFILTRATION; PERINEURAL
Status: DISPENSED
Start: 2022-01-05

## (undated) RX ORDER — LIDOCAINE HYDROCHLORIDE 10 MG/ML
INJECTION, SOLUTION INFILTRATION; PERINEURAL
Status: DISPENSED
Start: 2020-09-03

## (undated) RX ORDER — LIDOCAINE HYDROCHLORIDE 10 MG/ML
INJECTION, SOLUTION EPIDURAL; INFILTRATION; INTRACAUDAL; PERINEURAL
Status: DISPENSED
Start: 2020-09-22

## (undated) RX ORDER — TRIAMCINOLONE ACETONIDE 40 MG/ML
INJECTION, SUSPENSION INTRA-ARTICULAR; INTRAMUSCULAR
Status: DISPENSED
Start: 2022-01-05

## (undated) RX ORDER — BUPIVACAINE HYDROCHLORIDE 5 MG/ML
INJECTION, SOLUTION EPIDURAL; INTRACAUDAL
Status: DISPENSED
Start: 2023-12-26

## (undated) RX ORDER — TRIAMCINOLONE ACETONIDE 40 MG/ML
INJECTION, SUSPENSION INTRA-ARTICULAR; INTRAMUSCULAR
Status: DISPENSED
Start: 2023-09-06